# Patient Record
Sex: FEMALE | Race: WHITE | NOT HISPANIC OR LATINO | Employment: FULL TIME | URBAN - NONMETROPOLITAN AREA
[De-identification: names, ages, dates, MRNs, and addresses within clinical notes are randomized per-mention and may not be internally consistent; named-entity substitution may affect disease eponyms.]

---

## 2022-10-13 ENCOUNTER — MEDICAL CORRESPONDENCE (OUTPATIENT)
Dept: MRI IMAGING | Facility: HOSPITAL | Age: 37
End: 2022-10-13

## 2022-10-21 ENCOUNTER — HOSPITAL ENCOUNTER (OUTPATIENT)
Dept: MRI IMAGING | Facility: HOSPITAL | Age: 37
Discharge: HOME | End: 2022-10-21
Attending: ORTHOPAEDIC SURGERY | Admitting: ORTHOPAEDIC SURGERY
Payer: OTHER MISCELLANEOUS

## 2022-10-21 DIAGNOSIS — M25.561 RIGHT KNEE PAIN: ICD-10-CM

## 2022-10-21 PROCEDURE — 73721 MRI JNT OF LWR EXTRE W/O DYE: CPT | Mod: RT

## 2024-06-03 ENCOUNTER — APPOINTMENT (OUTPATIENT)
Dept: GENERAL RADIOLOGY | Facility: HOSPITAL | Age: 39
End: 2024-06-03
Attending: NURSE PRACTITIONER
Payer: COMMERCIAL

## 2024-06-03 ENCOUNTER — HOSPITAL ENCOUNTER (EMERGENCY)
Facility: HOSPITAL | Age: 39
Discharge: HOME | End: 2024-06-03
Attending: NURSE PRACTITIONER | Admitting: NURSE PRACTITIONER
Payer: COMMERCIAL

## 2024-06-03 VITALS
HEART RATE: 94 BPM | DIASTOLIC BLOOD PRESSURE: 104 MMHG | SYSTOLIC BLOOD PRESSURE: 163 MMHG | TEMPERATURE: 98.2 F | RESPIRATION RATE: 18 BRPM | OXYGEN SATURATION: 98 %

## 2024-06-03 DIAGNOSIS — M54.50 ACUTE LEFT-SIDED LOW BACK PAIN WITHOUT SCIATICA: ICD-10-CM

## 2024-06-03 DIAGNOSIS — M25.552 HIP PAIN, LEFT: Primary | ICD-10-CM

## 2024-06-03 PROCEDURE — G0463 HOSPITAL OUTPT CLINIC VISIT: HCPCS | Mod: 25

## 2024-06-03 PROCEDURE — 72100 X-RAY EXAM L-S SPINE 2/3 VWS: CPT

## 2024-06-03 PROCEDURE — 99213 OFFICE O/P EST LOW 20 MIN: CPT | Performed by: NURSE PRACTITIONER

## 2024-06-03 PROCEDURE — 73502 X-RAY EXAM HIP UNI 2-3 VIEWS: CPT

## 2024-06-03 PROCEDURE — 250N000011 HC RX IP 250 OP 636: Performed by: NURSE PRACTITIONER

## 2024-06-03 PROCEDURE — 250N000013 HC RX MED GY IP 250 OP 250 PS 637: Performed by: NURSE PRACTITIONER

## 2024-06-03 PROCEDURE — 96372 THER/PROPH/DIAG INJ SC/IM: CPT | Performed by: NURSE PRACTITIONER

## 2024-06-03 RX ORDER — METHOCARBAMOL 500 MG/1
500 TABLET, FILM COATED ORAL 3 TIMES DAILY PRN
Qty: 15 TABLET | Refills: 0 | Status: SHIPPED | OUTPATIENT
Start: 2024-06-03

## 2024-06-03 RX ORDER — KETOROLAC TROMETHAMINE 30 MG/ML
30 INJECTION, SOLUTION INTRAMUSCULAR; INTRAVENOUS ONCE
Status: COMPLETED | OUTPATIENT
Start: 2024-06-03 | End: 2024-06-03

## 2024-06-03 RX ORDER — ACETAMINOPHEN 325 MG/1
975 TABLET ORAL ONCE
Status: COMPLETED | OUTPATIENT
Start: 2024-06-03 | End: 2024-06-03

## 2024-06-03 RX ADMIN — ACETAMINOPHEN 975 MG: 325 TABLET, FILM COATED ORAL at 18:27

## 2024-06-03 RX ADMIN — KETOROLAC TROMETHAMINE 30 MG: 30 INJECTION, SOLUTION INTRAMUSCULAR at 18:27

## 2024-06-03 ASSESSMENT — ACTIVITIES OF DAILY LIVING (ADL)
ADLS_ACUITY_SCORE: 35
ADLS_ACUITY_SCORE: 35

## 2024-06-03 ASSESSMENT — ENCOUNTER SYMPTOMS
COLOR CHANGE: 0
WOUND: 0
BACK PAIN: 1

## 2024-06-03 ASSESSMENT — COLUMBIA-SUICIDE SEVERITY RATING SCALE - C-SSRS
2. HAVE YOU ACTUALLY HAD ANY THOUGHTS OF KILLING YOURSELF IN THE PAST MONTH?: NO
1. IN THE PAST MONTH, HAVE YOU WISHED YOU WERE DEAD OR WISHED YOU COULD GO TO SLEEP AND NOT WAKE UP?: NO
6. HAVE YOU EVER DONE ANYTHING, STARTED TO DO ANYTHING, OR PREPARED TO DO ANYTHING TO END YOUR LIFE?: NO

## 2024-06-03 NOTE — ED TRIAGE NOTES
Pt presents today with c/o hip pain and low back pain. States she was walking yesterday and got sharp pain. Today she felt a radiating pain go into her back left side. Also needs doctors note for work.

## 2024-06-03 NOTE — ED PROVIDER NOTES
History     Chief Complaint   Patient presents with    Hip Pain     HPI  Lety Vanessa is a 38 year old female who presents to urgent care for evaluation of left hip pain and low back pain.  Yesterday while patient was walking she developed a sharp pain into the anterior left hip.  She states that she feels warmth to the anterior left hip.  No skin color changes.  Now she states that she is having difficulty bearing weight to the affected extremity.  She was favoring the left leg a lot when she bent down to put on her socks and then developed low back pain.  She states she felt something pop in her low back.  She is currently using crutches to ambulate.  Pain to her hip worsens with certain movements.  Denies any history of surgeries to her hip or her back.  Has not taken anything for pain.    Allergies:  No Known Allergies    Problem List:    There are no problems to display for this patient.       Past Medical History:    No past medical history on file.    Past Surgical History:    No past surgical history on file.    Family History:    No family history on file.    Social History:  Marital Status:   [2]        Medications:    methocarbamol (ROBAXIN) 500 MG tablet          Review of Systems   Musculoskeletal:  Positive for back pain and gait problem.   Skin:  Negative for color change and wound.   All other systems reviewed and are negative.      Physical Exam   BP: (!) 163/104  Pulse: 94  Temp: 98.2  F (36.8  C)  Resp: 18  SpO2: 98 %      Physical Exam  Vitals and nursing note reviewed.   Constitutional:       General: She is not in acute distress.     Appearance: Normal appearance. She is not diaphoretic.   HENT:      Head: Atraumatic.   Eyes:      Conjunctiva/sclera: Conjunctivae normal.   Cardiovascular:      Rate and Rhythm: Normal rate.   Pulmonary:      Effort: Pulmonary effort is normal. No respiratory distress.   Musculoskeletal:      Cervical back: Normal range of motion and neck supple. No  tenderness.      Comments: Paralumbar tenderness to palpation.  No step-offs or edema.  Tenderness to lateral left hip on palpation.  Reports tenderness to the left groin area with  range of motion movements of the left hip.  Negative straight leg raise test.     Skin:     General: Skin is warm.      Capillary Refill: Capillary refill takes less than 2 seconds.      Coloration: Skin is not pale.   Neurological:      Mental Status: She is alert and oriented to person, place, and time.         ED Course        Procedures           Results for orders placed or performed during the hospital encounter of 06/03/24 (from the past 24 hour(s))   XR Pelvis and Hip Left 2 Views    Narrative    XR LUMBAR SPINE 2/3 VIEWS, XR PELVIS AND HIP LEFT 2 VIEWS    HISTORY: low back pain since today; no direct trauma or injury, .    COMPARISON: None.    TECHNIQUE: 3 views of the lumbosacral spine, 3 views of the pelvis and  left hip.    FINDINGS:    No acute fracture. Facet degenerative hypertrophy is most pronounced  at L4-5 and L5-S1. SI joint degeneration is seen. An IUD is in place.  The hip joint spaces are preserved.        Impression    IMPRESSION:     Lumbar facet and SI joint degeneration. Preserved hip joint spaces.      ALIVIA FONG MD         SYSTEM ID:  [id number]   XR Lumbar Spine 2/3 Views    Narrative    XR LUMBAR SPINE 2/3 VIEWS, XR PELVIS AND HIP LEFT 2 VIEWS    HISTORY: low back pain since today; no direct trauma or injury, .    COMPARISON: None.    TECHNIQUE: 3 views of the lumbosacral spine, 3 views of the pelvis and  left hip.    FINDINGS:    No acute fracture. Facet degenerative hypertrophy is most pronounced  at L4-5 and L5-S1. SI joint degeneration is seen. An IUD is in place.  The hip joint spaces are preserved.        Impression    IMPRESSION:     Lumbar facet and SI joint degeneration. Preserved hip joint spaces.      ALIVIA FONG MD         SYSTEM ID:  [id number]       Medications   ketorolac  (TORADOL) injection 30 mg (30 mg Intramuscular $Given 6/3/24 1827)   acetaminophen (TYLENOL) tablet 975 mg (975 mg Oral $Given 6/3/24 1827)       Assessments & Plan (with Medical Decision Making)   Pleasant 38-year-old female that presented evaluation of left hip pain and low back pain with no known injury.  X-rays of her left hip and lumbar spine were negative for acute fractures.  Radiologist noted findings consistent with lumbar facet and SI joint degeneration.  Discussed the possibility of an SI joint dysfunction as a cause of her symptoms.  She certainly could have strained her back when she bent down earlier resulting in the low back pain.  Recommended taking anti-inflammatories, apply ice packs for 20 minutes at a time and taking muscle relaxants as prescribed.  I advised close follow-up with primary doctor for reevaluation of the hip pain especially if she continues having difficulty bearing weight.  She will return to urgent care or emergency room for any worsening or concerning symptoms.    I have reviewed the nursing notes.    I have reviewed the findings, diagnosis, plan and need for follow up with the patient.  This document was prepared using a combination of typing and voice generated software.  While every attempt was made for accuracy, spelling and grammatical errors may exist.         New Prescriptions    METHOCARBAMOL (ROBAXIN) 500 MG TABLET    Take 1 tablet (500 mg) by mouth 3 times daily as needed for muscle spasms       Final diagnoses:   Hip pain, left   Acute left-sided low back pain without sciatica       6/3/2024   [facility]       Mpofu, Prudence, CNP  06/04/24 0922

## 2024-06-03 NOTE — Clinical Note
Lety Vanessa was seen and treated in our emergency department on 6/3/2024.    Please excuse her from work and for up to 1 week.  She may return once her symptoms improve.     Sincerely,     [facility]

## 2024-06-04 NOTE — DISCHARGE INSTRUCTIONS
Your x-rays do not show anything that is fractured or out of place.  You likely strained your back.  Unsure what is causing your left hip pain.    Take the muscle relaxant and as prescribed.  Ibuprofen as needed for pain.  Continue using crutches as needed to ambulate.  I     recommend closely following up with your primary doctor for reevaluation.  Return to urgent care or emergency room for any worsening or concerning symptoms.